# Patient Record
(demographics unavailable — no encounter records)

---

## 2018-01-01 NOTE — XR
EXAMINATION TYPE: XR chest 2V

 

DATE OF EXAM: 2018

 

COMPARISON: NONE

 

TECHNIQUE: PA and lateral views submitted.

 

HISTORY: Difficulty breathing

 

FINDINGS:

The lungs are clear and  there is no pneumothorax, pleural effusion, or focal pneumonia.  Diffuse ret
icular pattern. Positioning limits assessment of the cardiomediastinal silhouette.

 

IMPRESSION: 

1. Findings are most suggestive of RDS. Correlate clinically.

## 2018-01-01 NOTE — P.HPPD
History of Present Illness


H&P Date: 18


Chief Complaint:  baby with tespiratory distress


Called in emergently to see this baby who was born at 1338 hrs. today at 34 

week gestation age and developed respiratory distress





This 34 week gestation age male baby was delivered at 1338 hrs. on 2018 

to a 27-year-old  mother, A+ antibody screen negative rubella immune and 

hepatitis B negative GBS unknown.  Expected date of confinement was 2018.

  Rupture of membranes was done 12 hours prior to delivery.  Baby cried well 

after birth and was given Apgars of 8 at 1 and 8 at 5.





The baby was brought immediately to level I nursery for close monitoring.  Soon 

after the baby developed grunting respirations with moaning.  O2 was started by 

nasal cannula and a blood gases were drawn which was initially reported to be a 

pH of 7.19/pCO2 of 60/pO2 of 115/bicarb of 22.  A chest x-ray showed mild 

groundglass appearance and as the baby continued to have respiratory distress, 

the baby was started on high flow oxygen.  A repeat gases done one hour after 

the change was reported as a pH of 7.26/pCO2 of 53/ pO2 of 42 and a bicarb of 

23. 





An IV line was started with a deep tender blue at 90 mL per KG and after 

collection of blood samples for CBC with differential and blood culture, IV 

antibiotics (ampicillin and gentamicin) were initiated.  The case was discussed 

with Dr. Elizabeth at an ICU of [facility] who accepted the transfer of 

the baby for further management of prematurity and associated problems.








Review of Systems


Review of Systems Narrative: 


As detailed in HPI








Past Medical History


Past Medical History: No Reported History





Medications and Allergies


Home Medications and Allergies Comment(s): 


None as the baby is a 





 Allergies











Allergy/AdvReac Type Severity Reaction Status Date / Time


 


No Known Allergies Allergy   Verified 18 14:05














Exam


 Vital Signs











  Temp Pulse Pulse Resp BP BP BP


 


 18 15:03  98.2 F   142  44   


 


 18 14:26    156  44  58/30  55/28  55/30


 


 18 13:43  100.0 F H  140  136  40   














  Pulse Ox


 


 18 15:03  98


 


 18 14:26  100


 


 18 13:43 








 Intake and Output











 18





 06:59 14:59 22:59


 


Intake Total  7.2 7.2


 


Balance  7.2 7.2


 


Intake:   


 


  IV  7.2 7.2


 


    Invasive Line 1  7.2 7.2


 


Other:   


 


  Weight  2.17 kg 











On examination





The baby is lying in crib with audible respirations


Has cannula in nose for high flow oxygen


Heart sounds are 1 45 bpm,  respirations are 45 breaths per minute and O2 sats 

are 98% 


 features present


No dysmorphic features


Anterior fontanelle is open soft and flat 


No cleft lip or cleft palate


No neck masses palpated


HEENT exam is normal


Lungs with good air exchange bilaterally.  The baby has morning breath sounds 

with mild subcostal retractions.


Heart sounds are normal with no murmurs heard and capillary refill is 3 seconds


Abdomen is scaphoid nontender nondistended no masses palpable umbilical cord 

shows 3 vessels


Genitalia that of a  male with both testicles descended


Ortolani and Orta tests are negative


No rashes are seen








Results





- Laboratory Findings





 18 14:05





 18 14:05


 Abnormal Lab Results - Last 24 Hours (Table)











  18 Range/Units





  14:05 14:40 15:40 


 


RBC  5.76 H    (3.90-5.50)  m/uL


 


Hgb  21.2 H*    (9.0-14.0)  gm/dL


 


Hct  65.1 H*    (45.0-64.0)  %


 


RDW  16.3 H    (11.5-15.5)  %


 


Nucleated RBCs  15 H    (0-5)  /100 WBC


 


Capillary pH   7.19 L*   (7.35-7.45)  


 


Capillary pCO2   60 H*   (35-48)  mmHg


 


Capillary pO2   115 H   ()  mmHg


 


POC Glucose (mg/dL)    140 H  ()  mg/dL














  18 Range/Units





  15:45 


 


RBC   (3.90-5.50)  m/uL


 


Hgb   (9.0-14.0)  gm/dL


 


Hct   (45.0-64.0)  %


 


RDW   (11.5-15.5)  %


 


Nucleated RBCs   (0-5)  /100 WBC


 


Capillary pH  7.26 L  (7.35-7.45)  


 


Capillary pCO2  53 H*  (35-48)  mmHg


 


Capillary pO2  42 L*  ()  mmHg


 


POC Glucose (mg/dL)   ()  mg/dL














Assessment and Plan


(1) Prematurity


Narrative/Plan: 


This baby is being transferred to NICU at [facility] due to the 34 week 

premature birth.


Current Visit: Yes   Status: Acute   Code(s): P07.30 -  , 

UNSPECIFIED WEEKS OF GESTATION   SNOMED Code(s): 439445942


   





(2) Respiratory distress of 


Narrative/Plan: 


The baby is currently on high flow oxygen and saturations have improved to the 

high 90s.


Current Visit: Yes   Status: Acute   Code(s): P22.9 - RESPIRATORY DISTRESS OF 

, UNSPECIFIED   SNOMED Code(s): 59494974


   





(3) Sepsis in 


Narrative/Plan: 


IV ampicillin and IV gentamicin have been started after collecting samples of 

blood for culture and CBC with differential


Current Visit: Yes   Status: Acute   Code(s): P36.9 - BACTERIAL SEPSIS OF 

, UNSPECIFIED   SNOMED Code(s): 182511664


   


Plan: 


Plan is to transfer this baby to an ICU at [facility] via ambulance.  

The transport team is on its way and will be here shortly.  I will discuss the 

plan of management with the baby's father was present at bedside and he 

expresses his understanding





Time with Patient: Greater than 30